# Patient Record
Sex: MALE | Race: WHITE | NOT HISPANIC OR LATINO
[De-identification: names, ages, dates, MRNs, and addresses within clinical notes are randomized per-mention and may not be internally consistent; named-entity substitution may affect disease eponyms.]

---

## 2024-11-13 ENCOUNTER — DASHBOARD ENCOUNTERS (OUTPATIENT)
Age: 54
End: 2024-11-13

## 2024-11-13 ENCOUNTER — LAB OUTSIDE AN ENCOUNTER (OUTPATIENT)
Dept: URBAN - METROPOLITAN AREA CLINIC 35 | Facility: CLINIC | Age: 54
End: 2024-11-13

## 2024-11-13 ENCOUNTER — OFFICE VISIT (OUTPATIENT)
Dept: URBAN - METROPOLITAN AREA CLINIC 35 | Facility: CLINIC | Age: 54
End: 2024-11-13
Payer: COMMERCIAL

## 2024-11-13 VITALS
HEART RATE: 73 BPM | DIASTOLIC BLOOD PRESSURE: 60 MMHG | WEIGHT: 218 LBS | BODY MASS INDEX: 29.53 KG/M2 | HEIGHT: 72 IN | OXYGEN SATURATION: 97 % | SYSTOLIC BLOOD PRESSURE: 96 MMHG

## 2024-11-13 DIAGNOSIS — K62.5 RECTAL BLEEDING: ICD-10-CM

## 2024-11-13 DIAGNOSIS — Z12.11 SCREENING FOR COLON CANCER: ICD-10-CM

## 2024-11-13 DIAGNOSIS — R12 HEARTBURN: ICD-10-CM

## 2024-11-13 DIAGNOSIS — R10.13 EPIGASTRIC DISCOMFORT: ICD-10-CM

## 2024-11-13 PROCEDURE — 99204 OFFICE O/P NEW MOD 45 MIN: CPT | Performed by: STUDENT IN AN ORGANIZED HEALTH CARE EDUCATION/TRAINING PROGRAM

## 2024-11-13 NOTE — HPI-TODAY'S VISIT:
54 year old male patient presents for Consultation for rectal bleeding. Reports on and off since his 20s. Generally has BM every 2-3 days. Reports looser consistency. No straining. Intermittent episodes of upper abdominal pain couple times per year, and wont eat for couple days. No vomitting. Reports had has lot of episodes of heartburn with nearly every meal up until about a month ago. Took PPI for period of time which helped with both epigastric discomfort and heartburn. Currently off  No issues swallowWeight stable. Had labs 1 year ago, denies family hx   Outside labs 12-11-23 reviewed  hb 15.8 plt 311 ast 25, alt 32 alk oneil 110 bili 0.4

## 2024-11-19 ENCOUNTER — APPOINTMENT (RX ONLY)
Dept: URBAN - METROPOLITAN AREA CLINIC 28 | Facility: CLINIC | Age: 54
Setting detail: DERMATOLOGY
End: 2024-11-19

## 2024-11-19 DIAGNOSIS — L81.4 OTHER MELANIN HYPERPIGMENTATION: ICD-10-CM

## 2024-11-19 DIAGNOSIS — Z12.83 ENCOUNTER FOR SCREENING FOR MALIGNANT NEOPLASM OF SKIN: ICD-10-CM

## 2024-11-19 DIAGNOSIS — L91.8 OTHER HYPERTROPHIC DISORDERS OF THE SKIN: ICD-10-CM

## 2024-11-19 DIAGNOSIS — D18.0 HEMANGIOMA: ICD-10-CM

## 2024-11-19 PROBLEM — D18.01 HEMANGIOMA OF SKIN AND SUBCUTANEOUS TISSUE: Status: ACTIVE | Noted: 2024-11-19

## 2024-11-19 PROBLEM — D23.71 OTHER BENIGN NEOPLASM OF SKIN OF RIGHT LOWER LIMB, INCLUDING HIP: Status: ACTIVE | Noted: 2024-11-19

## 2024-11-19 PROCEDURE — 99213 OFFICE O/P EST LOW 20 MIN: CPT

## 2024-11-19 PROCEDURE — ? COUNSELING

## 2024-11-19 ASSESSMENT — LOCATION SIMPLE DESCRIPTION DERM
LOCATION SIMPLE: LEFT LOWER BACK
LOCATION SIMPLE: RIGHT CHEEK
LOCATION SIMPLE: RIGHT FOREHEAD
LOCATION SIMPLE: LEFT CHEEK
LOCATION SIMPLE: ABDOMEN
LOCATION SIMPLE: LEFT FOREHEAD

## 2024-11-19 ASSESSMENT — LOCATION ZONE DERM
LOCATION ZONE: TRUNK
LOCATION ZONE: FACE

## 2024-11-19 ASSESSMENT — LOCATION DETAILED DESCRIPTION DERM
LOCATION DETAILED: LEFT CENTRAL MALAR CHEEK
LOCATION DETAILED: RIGHT INFERIOR CENTRAL MALAR CHEEK
LOCATION DETAILED: LEFT INFERIOR LATERAL MIDBACK
LOCATION DETAILED: EPIGASTRIC SKIN
LOCATION DETAILED: LEFT FOREHEAD
LOCATION DETAILED: RIGHT FOREHEAD
LOCATION DETAILED: LEFT MEDIAL FOREHEAD

## 2024-11-19 NOTE — HPI: EVALUATION OF SKIN LESION(S)
Hpi Title: Evaluation of a Skin Lesion
Additional History: Spot on back that he thinks is a skin tag that is growing

## 2024-11-19 NOTE — PROCEDURE: COUNSELING
Patient Specific Counseling (Will Not Stick From Patient To Patient): -\\n\\nLooks okay. smooth exophytic skin colored tag, about 6-7mm. Usually leave them alone. With any changes or concerned, return to clinic. He agrees with observation.
Detail Level: Detailed
Skin Checks Recommendations: SSE q month\\nFBSE with derm q year
Detail Level: Generalized
Patient Specific Counseling (Will Not Stick From Patient To Patient): -\\n\\nPatient asked about removal of the angiomas of his forehead. Usually we leave them alone but patient could see laser specialist Dr. Lindsey for consult. Process discussed. He will consider. Card given.
Detail Level: Zone

## 2024-12-16 ENCOUNTER — CLAIMS CREATED FROM THE CLAIM WINDOW (OUTPATIENT)
Dept: URBAN - METROPOLITAN AREA SURGERY CENTER 8 | Facility: SURGERY CENTER | Age: 54
End: 2024-12-16
Payer: COMMERCIAL

## 2024-12-16 ENCOUNTER — CLAIMS CREATED FROM THE CLAIM WINDOW (OUTPATIENT)
Dept: URBAN - METROPOLITAN AREA CLINIC 4 | Facility: CLINIC | Age: 54
End: 2024-12-16
Payer: COMMERCIAL

## 2024-12-16 DIAGNOSIS — K20.80 OTHER ESOPHAGITIS: ICD-10-CM

## 2024-12-16 DIAGNOSIS — K64.8 HEMORRHOIDS, INTERNAL: ICD-10-CM

## 2024-12-16 DIAGNOSIS — D12.5 BENIGN NEOPLASM OF SIGMOID COLON: ICD-10-CM

## 2024-12-16 DIAGNOSIS — D12.5 ADENOMA OF SIGMOID COLON: ICD-10-CM

## 2024-12-16 DIAGNOSIS — K62.5 HEMORRHAGE OF ANUS AND RECTUM: ICD-10-CM

## 2024-12-16 DIAGNOSIS — K62.5 RECTAL BLEEDING: ICD-10-CM

## 2024-12-16 DIAGNOSIS — D13.1 BENIGN NEOPLASM OF STOMACH: ICD-10-CM

## 2024-12-16 DIAGNOSIS — K31.7 GASTRIC POLYP: ICD-10-CM

## 2024-12-16 DIAGNOSIS — K31.89 OTHER DISEASES OF STOMACH AND DUODENUM: ICD-10-CM

## 2024-12-16 DIAGNOSIS — K31.7 POLYP OF STOMACH AND DUODENUM: ICD-10-CM

## 2024-12-16 DIAGNOSIS — R10.13 ABDOMINAL PAIN, EPIGASTRIC: ICD-10-CM

## 2024-12-16 PROBLEM — 266433003: Status: ACTIVE | Noted: 2024-12-16

## 2024-12-16 PROCEDURE — 88305 TISSUE EXAM BY PATHOLOGIST: CPT | Performed by: PATHOLOGY

## 2024-12-16 PROCEDURE — 00813 ANES UPR LWR GI NDSC PX: CPT | Performed by: NURSE ANESTHETIST, CERTIFIED REGISTERED

## 2024-12-16 PROCEDURE — 45398 COLONOSCOPY W/BAND LIGATION: CPT | Performed by: STUDENT IN AN ORGANIZED HEALTH CARE EDUCATION/TRAINING PROGRAM

## 2024-12-16 PROCEDURE — 45385 COLONOSCOPY W/LESION REMOVAL: CPT | Performed by: STUDENT IN AN ORGANIZED HEALTH CARE EDUCATION/TRAINING PROGRAM

## 2024-12-16 PROCEDURE — 43239 EGD BIOPSY SINGLE/MULTIPLE: CPT | Performed by: STUDENT IN AN ORGANIZED HEALTH CARE EDUCATION/TRAINING PROGRAM

## 2024-12-16 RX ORDER — OMEPRAZOLE 40 MG/1
1 CAPSULE 30 MINUTES BEFORE MEAL CAPSULE, DELAYED RELEASE ORAL TWICE A DAY
Qty: 180 | Refills: 2 | OUTPATIENT
Start: 2024-12-16

## 2025-01-08 ENCOUNTER — OFFICE VISIT (OUTPATIENT)
Dept: URBAN - METROPOLITAN AREA CLINIC 35 | Facility: CLINIC | Age: 55
End: 2025-01-08
Payer: COMMERCIAL

## 2025-01-08 ENCOUNTER — LAB OUTSIDE AN ENCOUNTER (OUTPATIENT)
Dept: URBAN - METROPOLITAN AREA CLINIC 35 | Facility: CLINIC | Age: 55
End: 2025-01-08

## 2025-01-08 VITALS
SYSTOLIC BLOOD PRESSURE: 116 MMHG | HEIGHT: 72 IN | HEART RATE: 64 BPM | BODY MASS INDEX: 30.2 KG/M2 | OXYGEN SATURATION: 98 % | WEIGHT: 223 LBS | DIASTOLIC BLOOD PRESSURE: 84 MMHG

## 2025-01-08 DIAGNOSIS — D12.6 SERRATED ADENOMA OF COLON: ICD-10-CM

## 2025-01-08 DIAGNOSIS — K21.00 GASTROESOPHAGEAL REFLUX DISEASE WITH ESOPHAGITIS WITHOUT HEMORRHAGE: ICD-10-CM

## 2025-01-08 DIAGNOSIS — K64.8 INTERNAL HEMORRHOIDS: ICD-10-CM

## 2025-01-08 DIAGNOSIS — K62.5 RECTAL BLEEDING: ICD-10-CM

## 2025-01-08 DIAGNOSIS — D13.1 ADENOMA OF STOMACH DETERMINED BY BIOPSY: ICD-10-CM

## 2025-01-08 PROCEDURE — 99214 OFFICE O/P EST MOD 30 MIN: CPT | Performed by: STUDENT IN AN ORGANIZED HEALTH CARE EDUCATION/TRAINING PROGRAM

## 2025-01-08 RX ORDER — OMEPRAZOLE 40 MG/1
1 CAPSULE 30 MINUTES BEFORE MEAL CAPSULE, DELAYED RELEASE ORAL TWICE A DAY
Qty: 180 | Refills: 2 | Status: ACTIVE | COMMUNITY
Start: 2024-12-16

## 2025-01-08 NOTE — HPI-TODAY'S VISIT:
54 year old male patient presents for followup for Colonoscopy/EGD, rectal bleeding, epigastric discomfort. No rectal bleeding, has occasional discomfort.  No protrusion. Has BM once daily. Taking metamucil. He is taking omeprazole 40 mg BID, denies further epigastric discomfort or heartburn.  Procedure Notes: 12/16/2024 Colonoscopy: Impression: - The examined portion of the ileum was normal. - Diverticulosis in the sigmoid colon. - One 2 mm polyp in the sigmoid colon, removed with a cold snare. Resected and retrieved. - One 10 mm polyp in the distal sigmoid colon, removed with a hot snare. Resected and retrieved. Clip was placed. ( : [employer]. - Internal hemorrhoids. Banded. EGD: Impression: - Normal hypopharynx. - LA Grade C reflux esophagitis. - Small hiatal hernia. - A single gastric polyp. Biopsied - Normal duodenal bulb and second portion of the duodenum. -Biopsies were taken with a cold forceps for Helicobacter pylori testing. Path Report Shows: (A) Stomach, Antrum;Body, Biopsy (Cold Forceps): NO SIGNIFICANT ABNORMALITY. (B) Stomach, Body, Biopsy (Cold Forceps): GASTRIC ADENOMA, FOVEOLAR TYPE.Negative For H. Pylori or Malignancy. (C) Colon, Sigmoid, Polypectomy (Cold Forceps): TRADITIONAL SERRATED ADENOMA(S).  Last Visit(11/13/2024) 54 year old male patient presents for Consultation for rectal bleeding. Reports on and off since his 20s. Generally has BM every 2-3 days. Reports looser consistency. No straining. Intermittent episodes of upper abdominal pain couple times per year, and wont eat for couple days. No vomitting. Reports had has lot of episodes of heartburn with nearly every meal up until about a month ago. Took PPI for period of time which helped with both epigastric discomfort and heartburn. Currently off  No issues swallowWeight stable. Had labs 1 year ago, denies family hx   Outside labs 12-11-23 reviewed  hb 15.8 plt 311 ast 25, alt 32 alk oneil 110 bili 0.4

## 2025-02-27 ENCOUNTER — OFFICE VISIT (OUTPATIENT)
Dept: URBAN - METROPOLITAN AREA SURGERY CENTER 8 | Facility: SURGERY CENTER | Age: 55
End: 2025-02-27

## 2025-02-27 RX ORDER — OMEPRAZOLE 40 MG/1
1 CAPSULE 30 MINUTES BEFORE MEAL CAPSULE, DELAYED RELEASE ORAL TWICE A DAY
Qty: 180 | Refills: 2 | Status: ACTIVE | COMMUNITY
Start: 2024-12-16

## 2025-03-03 ENCOUNTER — OFFICE VISIT (OUTPATIENT)
Dept: URBAN - METROPOLITAN AREA SURGERY CENTER 8 | Facility: SURGERY CENTER | Age: 55
End: 2025-03-03

## 2025-03-19 ENCOUNTER — OFFICE VISIT (OUTPATIENT)
Dept: URBAN - METROPOLITAN AREA CLINIC 35 | Facility: CLINIC | Age: 55
End: 2025-03-19
Payer: COMMERCIAL

## 2025-03-19 ENCOUNTER — LAB OUTSIDE AN ENCOUNTER (OUTPATIENT)
Dept: URBAN - METROPOLITAN AREA CLINIC 35 | Facility: CLINIC | Age: 55
End: 2025-03-19

## 2025-03-19 VITALS
OXYGEN SATURATION: 99 % | SYSTOLIC BLOOD PRESSURE: 128 MMHG | BODY MASS INDEX: 30.07 KG/M2 | DIASTOLIC BLOOD PRESSURE: 86 MMHG | HEART RATE: 72 BPM | HEIGHT: 72 IN | WEIGHT: 222 LBS

## 2025-03-19 DIAGNOSIS — K22.70 BARRETT'S ESOPHAGUS WITHOUT DYSPLASIA: ICD-10-CM

## 2025-03-19 DIAGNOSIS — D12.6 SERRATED ADENOMA OF COLON: ICD-10-CM

## 2025-03-19 DIAGNOSIS — K82.4 GALLBLADDER POLYP: ICD-10-CM

## 2025-03-19 DIAGNOSIS — D13.1 ADENOMA OF STOMACH DETERMINED BY BIOPSY: ICD-10-CM

## 2025-03-19 DIAGNOSIS — K21.00 GASTROESOPHAGEAL REFLUX DISEASE WITH ESOPHAGITIS WITHOUT HEMORRHAGE: ICD-10-CM

## 2025-03-19 DIAGNOSIS — K62.5 RECTAL BLEEDING: ICD-10-CM

## 2025-03-19 DIAGNOSIS — K64.8 INTERNAL HEMORRHOIDS: ICD-10-CM

## 2025-03-19 PROBLEM — 197433003: Status: ACTIVE | Noted: 2025-03-19

## 2025-03-19 PROBLEM — 302914006: Status: ACTIVE | Noted: 2025-03-19

## 2025-03-19 PROCEDURE — 99214 OFFICE O/P EST MOD 30 MIN: CPT | Performed by: STUDENT IN AN ORGANIZED HEALTH CARE EDUCATION/TRAINING PROGRAM

## 2025-03-19 RX ORDER — OMEPRAZOLE 40 MG/1
1 CAPSULE 30 MINUTES BEFORE MEAL CAPSULE, DELAYED RELEASE ORAL TWICE A DAY
Qty: 180 | Refills: 2 | Status: ACTIVE | COMMUNITY
Start: 2024-12-16

## 2025-03-19 RX ORDER — OMEPRAZOLE 40 MG/1
1 CAPSULE 30 MINUTES BEFORE MEAL CAPSULE, DELAYED RELEASE ORAL ONCE A DAY
Qty: 90 | Refills: 4
Start: 2024-12-16

## 2025-03-19 NOTE — HPI-TODAY'S VISIT:
54-year-old male presents for follow-up for GERD with erosive esophagitis, short segment BE, gastric polyp , hemorrhoids with history of bleeding.  He reports doing well.  Taking omeprazole 40 mg twice daily still.  Denies any breakthrough, no issues swallowing.  Had 1 episode of minimal rectal bleeding 1 month ago has not recurred.  Taking Metamucil daily with daily bowel movement.  No family history of gastric or colon cancer.   EGD Report: 02/27/2025 Impression:  - Normal hypopharynx. - [place]-colored mucosa suspicious for short-segment Key's esophagus. Biopsied. - Three small gastric polyps. Resected and retrieved. - Biopsies were taken with a cold forceps for Helicobacter pylori testing. - Normal duodenal bulb and second portion of the duodenum.  Path Report:  (A) Stomach, Polypectomy (Cold Snare): FUNDIC GLAND POLYP WITH FOCAL LOW-GRADE DYSPLASIA/ADENOMA. Negative for H. Pylori or Malignancy. (B) Stomach, Antrum;Body, Biopsy (Cold Forceps): PROTON PUMP INHIBITOR EFFECT. No Evidence of H. Pylori Organisms or Intestinal Metaplasia. Negative For Dysplasia or Malignancy. (C) Esophagus, Distal, Biopsy (Cold Forceps): SQUAMOCOLUMNAR MUCOSA WITH FOCAL INTESTINAL METAPLASIA ARISING IN A BACKGROUND OF REFLUX-TYPE ESOPHAGITIS, CONSISTENT WITH KEY'S ESOPHAGUS IF CONFIRMED ENDOSCOPICALLY. See Comment. No Evidence of Infectious Organisms or Eosinophilic Esophagitis. Negative For Dysplasia or Malignancy.  Procedure Notes: 12/16/2024 Colonoscopy: Impression: - The examined portion of the ileum was normal. - Diverticulosis in the sigmoid colon. - One 2 mm polyp in the sigmoid colon, removed with a cold snare. Resected and retrieved. - One 10 mm polyp in the distal sigmoid colon, removed with a hot snare. Resected and retrieved. Clip was placed. ( : [employer]. - Internal hemorrhoids. Banded. EGD: Impression: - Normal hypopharynx. - LA Grade C reflux esophagitis. - Small hiatal hernia. - A single gastric polyp. Biopsied - Normal duodenal bulb and second portion of the duodenum. -Biopsies were taken with a cold forceps for Helicobacter pylori testing. Path Report Shows: (A) Stomach, Antrum;Body, Biopsy (Cold Forceps): NO SIGNIFICANT ABNORMALITY. (B) Stomach, Body, Biopsy (Cold Forceps): GASTRIC ADENOMA, FOVEOLAR TYPE.Negative For H. Pylori or Malignancy. (C) Colon, Sigmoid, Polypectomy (Cold Forceps): TRADITIONAL SERRATED ADENOMA(S).  Consult (11/13/2024) 54 year old male patient presents for Consultation for rectal bleeding. Reports on and off since his 20s. Generally has BM every 2-3 days. Reports looser consistency. No straining. Intermittent episodes of upper abdominal pain couple times per year, and wont eat for couple days. No vomitting. Reports had has lot of episodes of heartburn with nearly every meal up until about a month ago. Took PPI for period of time which helped with both epigastric discomfort and heartburn. Currently off  No issues swallowWeight stable. Had labs 1 year ago, denies family hx   Outside labs 12-11-23 reviewed  hb 15.8 plt 311 ast 25, alt 32 alk oneil 110 bili 0.4

## 2025-03-31 ENCOUNTER — TELEPHONE ENCOUNTER (OUTPATIENT)
Dept: URBAN - METROPOLITAN AREA CLINIC 35 | Facility: CLINIC | Age: 55
End: 2025-03-31